# Patient Record
Sex: MALE | Race: ASIAN | NOT HISPANIC OR LATINO | URBAN - METROPOLITAN AREA
[De-identification: names, ages, dates, MRNs, and addresses within clinical notes are randomized per-mention and may not be internally consistent; named-entity substitution may affect disease eponyms.]

---

## 2019-12-26 ENCOUNTER — OUTPATIENT (OUTPATIENT)
Dept: OUTPATIENT SERVICES | Facility: HOSPITAL | Age: 24
LOS: 1 days | Discharge: HOME | End: 2019-12-26

## 2019-12-27 DIAGNOSIS — F32.9 MAJOR DEPRESSIVE DISORDER, SINGLE EPISODE, UNSPECIFIED: ICD-10-CM

## 2019-12-27 DIAGNOSIS — G47.419 NARCOLEPSY WITHOUT CATAPLEXY: ICD-10-CM

## 2021-07-14 ENCOUNTER — NON-APPOINTMENT (OUTPATIENT)
Age: 26
End: 2021-07-14

## 2021-07-29 ENCOUNTER — APPOINTMENT (OUTPATIENT)
Age: 26
End: 2021-07-29
Payer: COMMERCIAL

## 2021-07-29 VITALS
SYSTOLIC BLOOD PRESSURE: 122 MMHG | WEIGHT: 216 LBS | BODY MASS INDEX: 30.24 KG/M2 | DIASTOLIC BLOOD PRESSURE: 70 MMHG | HEART RATE: 87 BPM | OXYGEN SATURATION: 96 % | HEIGHT: 71 IN | RESPIRATION RATE: 14 BRPM

## 2021-07-29 PROBLEM — Z00.00 ENCOUNTER FOR PREVENTIVE HEALTH EXAMINATION: Status: ACTIVE | Noted: 2021-07-29

## 2021-07-29 PROCEDURE — 99203 OFFICE O/P NEW LOW 30 MIN: CPT

## 2021-07-30 NOTE — HISTORY OF PRESENT ILLNESS
[TextBox_4] : The patient is a medical resident at the hospital. He has a prior history of narcolepsy. He was tested at [place] in 2018. \par I reviewed his past and current status.\par \par I reviewed all the previous sleep test that are available on file.\par \par  [Narcolepsy] : narcolepsy [Awakes Unrefreshed] : awakes unrefreshed [Awakes with Headache] : does not awaken with headache [Difficulty Initiating Sleep] : does not have difficulty initiating sleep [Difficulty Maintaining Sleep] : does not have difficulty maintaining sleep [Fatigue] : no fatigue [Frequent Nocturnal Awakening] : denies frequent nocturnal awakening [Hypnopompic Hallucinations] : hypnopompic hallucinations [Paresthesias] : denies paresthesias [Snoring] : no snoring [Tired while Driving] : not tired while driving [Unintentional Sleep while Active] : no unintentional sleep while active [Unintentional Sleep while Inactive] : no unintentional sleep while inactive [Unusual Movements] : no unusual movements [Unusual Sleep Behavior] : no unusual sleep behavior [Vivid dreams] : no vivid dreams [Witnessed Apneas] : no witnessed apneas [Hypersomnolence] : hypersomnolence [Cataplexy] : cataplexy [Sleep Paralysis] : sleep paralysis [Hypnagogic Hallucinations] : hypnagogic hallucinations [Hypnopompic Hallucinations] : hypnopompic hallucinations

## 2021-07-30 NOTE — ASSESSMENT
[FreeTextEntry1] : The patient has a diagnosis of narcolepsy with associated REM related phenomenon.\par He feel that he is well controlled.\par The REM related issues are infrequent and mild when they do occur.\par \par Plan.\par cont Armodafinil 250 Q AM\par long discussion about the treatment options if his situation changes.\par I would like to see him again in 6 months or sooner if his status changes.\par

## 2022-01-20 ENCOUNTER — APPOINTMENT (OUTPATIENT)
Age: 27
End: 2022-01-20
Payer: COMMERCIAL

## 2022-01-20 PROCEDURE — 99442: CPT

## 2022-02-03 ENCOUNTER — TRANSCRIPTION ENCOUNTER (OUTPATIENT)
Age: 27
End: 2022-02-03

## 2022-07-02 RX ORDER — ARMODAFINIL 250 MG/1
250 TABLET ORAL DAILY
Qty: 30 | Refills: 5 | Status: COMPLETED | COMMUNITY
End: 2022-07-02

## 2022-08-17 ENCOUNTER — APPOINTMENT (OUTPATIENT)
Dept: PULMONOLOGY | Facility: CLINIC | Age: 27
End: 2022-08-17

## 2022-08-17 PROCEDURE — 99212 OFFICE O/P EST SF 10 MIN: CPT | Mod: 95

## 2022-08-17 NOTE — ASSESSMENT
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

## 2022-08-17 NOTE — REASON FOR VISIT
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  He is taking the armodafinil with good response.  He states occasionally he will take a nap but it is usually only 15 minutes and that gives him good results.  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

## 2022-08-17 NOTE — ASSESSMENT
[FreeTextEntry1] : Assessment:\par Narcolepsy cateplexy doing well with therapy he is pleased with the outcome so far.\par \par PLAN:\par doing well with therapy\par F/U in 6 months\par We will will refill his medications today\par \par \par \par \par

## 2022-08-18 ENCOUNTER — NON-APPOINTMENT (OUTPATIENT)
Age: 27
End: 2022-08-18

## 2022-11-11 ENCOUNTER — NON-APPOINTMENT (OUTPATIENT)
Age: 27
End: 2022-11-11

## 2023-02-06 ENCOUNTER — APPOINTMENT (OUTPATIENT)
Age: 28
End: 2023-02-06

## 2023-02-21 ENCOUNTER — APPOINTMENT (OUTPATIENT)
Dept: PULMONOLOGY | Facility: CLINIC | Age: 28
End: 2023-02-21
Payer: COMMERCIAL

## 2023-02-21 DIAGNOSIS — G47.411 NARCOLEPSY WITH CATAPLEXY: ICD-10-CM

## 2023-02-21 PROCEDURE — 99442: CPT | Mod: 95

## 2023-02-21 NOTE — ASSESSMENT
[FreeTextEntry1] : Assessment:\par Narcolepsy cateplexy\par  he is well pleased so far with his outcome with the medication\par \par PLAN:\par doing well with therapy armodafinil 250 mg once daily\par F/U in 6 months\par \par total phone call  time 14min\par \par \par \par \par \par \par

## 2023-02-21 NOTE — REASON FOR VISIT
[Home] : at home, [unfilled] , at the time of the visit. [Medical Office: ([facility])___] : at the medical office located in  [Verbal consent obtained from patient] : the patient, [unfilled] [Follow-Up] : a follow-up visit [TextBox_44] : hx narcolepsy doing well in general some cataplexy 5x months very mild symptoms such as weakness in his hand

## 2023-05-22 NOTE — HISTORY OF PRESENT ILLNESS
[TextBox_4] : I reviewed all the previous sleep test that are available on file.\par I reviewed the previous office notes.\par  Drysol Counseling:  I discussed with the patient the risks of drysol/aluminum chloride including but not limited to skin rash, itching, irritation, burning.

## 2023-10-10 ENCOUNTER — TRANSCRIPTION ENCOUNTER (OUTPATIENT)
Age: 28
End: 2023-10-10

## 2024-02-23 ENCOUNTER — NON-APPOINTMENT (OUTPATIENT)
Age: 29
End: 2024-02-23

## 2024-04-10 ENCOUNTER — RX RENEWAL (OUTPATIENT)
Age: 29
End: 2024-04-10

## 2024-04-10 RX ORDER — ARMODAFINIL 250 MG/1
250 TABLET ORAL DAILY
Qty: 90 | Refills: 0 | Status: ACTIVE | COMMUNITY
Start: 1900-01-01 | End: 1900-01-01

## 2024-04-16 ENCOUNTER — APPOINTMENT (OUTPATIENT)
Dept: PULMONOLOGY | Facility: CLINIC | Age: 29
End: 2024-04-16
Payer: COMMERCIAL

## 2024-04-16 DIAGNOSIS — G47.411 NARCOLEPSY WITH CATAPLEXY: ICD-10-CM

## 2024-04-16 PROCEDURE — 99213 OFFICE O/P EST LOW 20 MIN: CPT

## 2024-04-16 NOTE — REASON FOR VISIT
[Home] : at home, [unfilled] , at the time of the visit. [Medical Office: ([facility])___] : at the medical office located in  [Patient] : the patient [Self] : self [Follow-Up] : a follow-up visit [TextBox_44] : History of narcolepsy doing extremely well.  Really has any cataplexy and it is usually when he is extremely sleep deprived.  It does not affect his is functioning at all.  He is able to take a nap for 10 to 15 minutes with complete resolution.  He is taking his medication without trouble.

## 2024-04-16 NOTE — ASSESSMENT
[FreeTextEntry1] : Assessment: Narcolepsy cateplexy Plexi is very mild and infrequent pleased  with his outcome with the medication Discussed the use of programs napping throughout the day  PLAN: doing well with therapy armodafinil 250 mg once daily F/U in 6 months

## 2024-09-10 ENCOUNTER — RX RENEWAL (OUTPATIENT)
Age: 29
End: 2024-09-10

## 2024-12-11 ENCOUNTER — RX RENEWAL (OUTPATIENT)
Age: 29
End: 2024-12-11

## 2025-03-11 RX ORDER — ARMODAFINIL 200 MG/1
200 TABLET ORAL
Qty: 30 | Refills: 0 | Status: ACTIVE | COMMUNITY
Start: 1900-01-01 | End: 1900-01-01

## 2025-05-12 ENCOUNTER — RX RENEWAL (OUTPATIENT)
Age: 30
End: 2025-05-12

## 2025-06-24 ENCOUNTER — NON-APPOINTMENT (OUTPATIENT)
Age: 30
End: 2025-06-24

## 2025-06-24 ENCOUNTER — APPOINTMENT (OUTPATIENT)
Dept: PULMONOLOGY | Facility: CLINIC | Age: 30
End: 2025-06-24
Payer: COMMERCIAL

## 2025-06-24 PROCEDURE — G2211 COMPLEX E/M VISIT ADD ON: CPT | Mod: 95

## 2025-06-24 PROCEDURE — 99213 OFFICE O/P EST LOW 20 MIN: CPT | Mod: 95

## 2025-07-10 ENCOUNTER — RX RENEWAL (OUTPATIENT)
Age: 30
End: 2025-07-10